# Patient Record
Sex: FEMALE | Race: WHITE | NOT HISPANIC OR LATINO | Employment: UNEMPLOYED
[De-identification: names, ages, dates, MRNs, and addresses within clinical notes are randomized per-mention and may not be internally consistent; named-entity substitution may affect disease eponyms.]

---

## 2018-10-23 ENCOUNTER — HOSPITAL (OUTPATIENT)
Dept: OTHER | Age: 48
End: 2018-10-23
Attending: OBSTETRICS & GYNECOLOGY

## 2023-11-03 ENCOUNTER — HOSPITAL ENCOUNTER (OUTPATIENT)
Dept: MAMMOGRAPHY | Age: 53
Discharge: HOME | End: 2023-11-03
Attending: OBSTETRICS & GYNECOLOGY

## 2023-11-03 DIAGNOSIS — Z12.31 ENCOUNTER FOR SCREENING MAMMOGRAM FOR MALIGNANT NEOPLASM OF BREAST: ICD-10-CM

## 2023-11-03 PROCEDURE — 77063 BREAST TOMOSYNTHESIS BI: CPT

## 2025-03-31 ENCOUNTER — HOSPITAL ENCOUNTER (OUTPATIENT)
Age: 55
Discharge: HOME | End: 2025-03-31
Attending: EMERGENCY MEDICINE
Payer: COMMERCIAL

## 2025-03-31 VITALS
TEMPERATURE: 98 F | RESPIRATION RATE: 16 BRPM | OXYGEN SATURATION: 100 % | HEART RATE: 71 BPM | SYSTOLIC BLOOD PRESSURE: 147 MMHG | DIASTOLIC BLOOD PRESSURE: 92 MMHG

## 2025-03-31 DIAGNOSIS — N39.0 URINARY TRACT INFECTION WITHOUT HEMATURIA, SITE UNSPECIFIED: Primary | ICD-10-CM

## 2025-03-31 LAB
BILIRUB UR QL STRIP: NEGATIVE
CLARITY UR: CLEAR
COLOR UR: YELLOW
GLUCOSE UR STRIP-MCNC: NEGATIVE MG/DL
KETONES UR STRIP-MCNC: NEGATIVE MG/DL
NITRITE UR QL STRIP: NEGATIVE
PH UR STRIP: 5.5 [PH]
PROT UR STRIP-MCNC: NEGATIVE MG/DL
SP GR UR STRIP: <=1.005
UROBILINOGEN UR STRIP-ACNC: <2 MG/DL

## 2025-03-31 PROCEDURE — 81002 URINALYSIS NONAUTO W/O SCOPE: CPT

## 2025-03-31 PROCEDURE — 99203 OFFICE O/P NEW LOW 30 MIN: CPT

## 2025-03-31 RX ORDER — CEFADROXIL 500 MG/1
500 CAPSULE ORAL 2 TIMES DAILY
Qty: 20 CAPSULE | Refills: 0 | Status: SHIPPED | OUTPATIENT
Start: 2025-03-31 | End: 2025-04-10

## 2025-03-31 RX ORDER — PHENAZOPYRIDINE HYDROCHLORIDE 200 MG/1
200 TABLET, FILM COATED ORAL 3 TIMES DAILY PRN
Qty: 6 TABLET | Refills: 0 | Status: SHIPPED | OUTPATIENT
Start: 2025-03-31 | End: 2025-04-02

## 2025-03-31 NOTE — ED INITIAL ASSESSMENT (HPI)
Patient reports gross hematuria Friday, Saturday and Sunday.  Sunday noted right flank pain x 1 hour.  Also reports headache and urinary urgency.

## 2025-03-31 NOTE — ED PROVIDER NOTES
Patient Seen in: [facility] [place]      History     Chief Complaint   Patient presents with    Urinary Symptoms     Stated Complaint: urinary issues, flank pain, headache    Subjective:   HPI      55 yo female with urinary symptoms. Started with blood in urine and urgency. Has had some right flank pain and headache. No fever. No vomiting. No abdominal pain.     Objective:     History reviewed. No pertinent past medical history.           History reviewed. No pertinent surgical history.             Social History     Socioeconomic History    Marital status:      Social Drivers of Health     Food Insecurity: No Food Insecurity (2/26/2025)    Received from [facility]    Hunger Vital Sign     Worried About Running Out of Food in the Last Year: Never true     Ran Out of Food in the Last Year: Never true   Transportation Needs: No Transportation Needs (2/26/2025)    Received from [facility]    PRAPARE - Transportation     Lack of Transportation (Medical): No     Lack of Transportation (Non-Medical): No   Housing Stability: Low Risk  (4/22/2024)    Received from [facility]    Housing Stability Vital Sign     Unable to Pay for Housing in the Last Year: No     Number of Places Lived in the Last Year: 1     Unstable Housing in the Last Year: No              Review of Systems    Positive for stated complaint: urinary issues, flank pain, headache  Other systems are as noted in HPI.  Constitutional and vital signs reviewed.      All other systems reviewed and negative except as noted above.    Physical Exam     ED Triage Vitals [03/31/25 1313]   BP (!) 147/92   Pulse 71   Resp 16   Temp 98.1 °F (36.7 °C)   Temp src Oral   SpO2 100 %   O2 Device None (Room air)       Current Vitals:   No data recorded    Physical Exam  Vitals and nursing note reviewed.   Constitutional:       Appearance: Normal appearance. She is well-developed.   HENT:      Head:  Normocephalic and atraumatic.   Cardiovascular:      Rate and Rhythm: Normal rate and regular rhythm.   Pulmonary:      Effort: Pulmonary effort is normal. No respiratory distress.   Abdominal:      General: There is no distension.      Palpations: Abdomen is soft.      Tenderness: There is no abdominal tenderness. There is no right CVA tenderness or left CVA tenderness.   Skin:     General: Skin is warm and dry.      Capillary Refill: Capillary refill takes less than 2 seconds.   Neurological:      General: No focal deficit present.      Mental Status: She is alert.   Psychiatric:         Mood and Affect: Mood normal.         Behavior: Behavior normal.            ED Course     Labs Reviewed   [facility] POCT URINALYSIS DIPSTICK - Abnormal; Notable for the following components:       Result Value    Blood, Urine Small (*)     Leukocyte esterase urine Trace (*)     All other components within normal limits                   MDM           Medical Decision Making  Bcterial cystitis, overactive bladder syndrome, interstitial cystitis, ascending infection all in differential.   Patient is well appearing.   UA reviewed and is consistent with infection. Will discharge on antibiotics and pyridium as prescribed. To ER for any new or worsening symptoms.     Disposition and Plan     Clinical Impression:  1. Urinary tract infection without hematuria, site unspecified         Disposition:  Discharge  3/31/2025  1:18 pm    Follow-up:  Kristen Padilla MD  [address] [place] [address]  [phone]      As needed          Medications Prescribed:  Discharge Medication List as of 3/31/2025  1:20 PM        START taking these medications    Details   cefadroxil 500 MG Oral Cap Take 1 capsule (500 mg total) by mouth 2 (two) times daily for 10 days., Normal, Disp-20 capsule, R-0      phenazopyridine 200 MG Oral Tab Take 1 tablet (200 mg total) by mouth 3 (three) times daily as needed for Pain., Normal, Disp-6 tablet, R-0                  Supplementary Documentation:

## 2025-04-04 ENCOUNTER — APPOINTMENT (OUTPATIENT)
Dept: CT IMAGING | Age: 55
End: 2025-04-04
Attending: NURSE PRACTITIONER
Payer: COMMERCIAL

## 2025-04-04 ENCOUNTER — HOSPITAL ENCOUNTER (OUTPATIENT)
Age: 55
Discharge: HOME | End: 2025-04-04
Payer: COMMERCIAL

## 2025-04-04 VITALS
HEART RATE: 68 BPM | RESPIRATION RATE: 17 BRPM | DIASTOLIC BLOOD PRESSURE: 75 MMHG | OXYGEN SATURATION: 100 % | TEMPERATURE: 98 F | SYSTOLIC BLOOD PRESSURE: 167 MMHG

## 2025-04-04 DIAGNOSIS — N20.0 KIDNEY STONE: Primary | ICD-10-CM

## 2025-04-04 LAB
BILIRUB UR QL STRIP: NEGATIVE
CLARITY UR: CLEAR
COLOR UR: YELLOW
GLUCOSE UR STRIP-MCNC: NEGATIVE MG/DL
KETONES UR STRIP-MCNC: NEGATIVE MG/DL
LEUKOCYTE ESTERASE UR QL STRIP: NEGATIVE
NITRITE UR QL STRIP: NEGATIVE
PH UR STRIP: 6 [PH]
PROT UR STRIP-MCNC: NEGATIVE MG/DL
SP GR UR STRIP: 1.01
UROBILINOGEN UR STRIP-ACNC: <2 MG/DL

## 2025-04-04 PROCEDURE — 99214 OFFICE O/P EST MOD 30 MIN: CPT

## 2025-04-04 PROCEDURE — 87086 URINE CULTURE/COLONY COUNT: CPT | Performed by: NURSE PRACTITIONER

## 2025-04-04 PROCEDURE — 74176 CT ABD & PELVIS W/O CONTRAST: CPT | Performed by: NURSE PRACTITIONER

## 2025-04-04 PROCEDURE — 81002 URINALYSIS NONAUTO W/O SCOPE: CPT

## 2025-04-04 NOTE — ED INITIAL ASSESSMENT (HPI)
Was seen here for uti on 3/31, pt on duricef, states still with urinary symptoms  with r flank pain, no fever, no n/v/d

## 2025-04-04 NOTE — ED PROVIDER NOTES
Patient Seen in: [facility] [place]      History     Chief Complaint   Patient presents with    Urinary Symptoms     Entered by patient     Stated Complaint: Urinary Symptoms    Subjective:   HPI      54-year-old female presents with complaints of urinary frequency and right low back pain.  Patient was seen in immediate care 3 days ago and was diagnosed with UTI.  She was placed on Duricef.  Patient states she has been taking the antibiotic but still is having back pain and is actually feeling worse.    Objective:     History reviewed. No pertinent past medical history.           History reviewed. No pertinent surgical history.             Social History     Socioeconomic History    Marital status:    Tobacco Use    Smoking status: Unknown   Vaping Use    Vaping status: Never Used   Substance and Sexual Activity    Alcohol use: Not Currently    Drug use: Not Currently     Social Drivers of Health     Food Insecurity: No Food Insecurity (2/26/2025)    Received from [facility]    Hunger Vital Sign     Worried About Running Out of Food in the Last Year: Never true     Ran Out of Food in the Last Year: Never true   Transportation Needs: No Transportation Needs (2/26/2025)    Received from [facility]    PRAPARE - Transportation     Lack of Transportation (Medical): No     Lack of Transportation (Non-Medical): No   Housing Stability: Low Risk  (4/22/2024)    Received from [facility]    Housing Stability Vital Sign     Unable to Pay for Housing in the Last Year: No     Number of Places Lived in the Last Year: 1     Unstable Housing in the Last Year: No              Review of Systems    Positive for stated complaint: Urinary Symptoms  Other systems are as noted in HPI.  Constitutional and vital signs reviewed.      All other systems reviewed and negative except as noted above.    Physical Exam     ED Triage Vitals [04/04/25 0927]   BP (!) 177/91    Pulse 68   Resp 17   Temp 98 °F (36.7 °C)   Temp src Oral   SpO2 100 %   O2 Device None (Room air)       Current Vitals:   Vital Signs  BP: (!) 167/75  Pulse: 68  Resp: 17  Temp: 98 °F (36.7 °C)  Temp src: Oral    Oxygen Therapy  SpO2: 100 %  O2 Device: None (Room air)        Physical Exam  Vitals reviewed.   Constitutional:       General: She is not in acute distress.  HENT:      Nose: Nose normal.   Cardiovascular:      Rate and Rhythm: Normal rate and regular rhythm.   Pulmonary:      Effort: Pulmonary effort is normal.      Breath sounds: Normal breath sounds.   Abdominal:      Palpations: Abdomen is soft.      Tenderness: There is no abdominal tenderness. There is no right CVA tenderness or left CVA tenderness.   Skin:     General: Skin is warm and dry.   Neurological:      General: No focal deficit present.      Mental Status: She is alert and oriented to person, place, and time.   Psychiatric:         Mood and Affect: Mood normal.         Behavior: Behavior normal.             ED Course     Labs Reviewed   [facility] POCT URINALYSIS DIPSTICK - Abnormal; Notable for the following components:       Result Value    Blood, Urine Small (*)     All other components within normal limits   URINE CULTURE, ROUTINE     POCT Urinalysis Dipstick        Component Value Flag Ref Range Units Status    Urine Color Yellow      Yellow  Final    Urine Clarity Clear      Clear  Final    Specific Gravity, Urine 1.010      1.005 - 1.030  Final    PH, Urine 6.0      5.0 - 8.0  Final    Protein urine Negative      Negative mg/dL Final    Glucose, Urine Negative      Negative mg/dL Final    Ketone, Urine Negative      Negative mg/dL Final    Bilirubin, Urine Negative      Negative  Final    Blood, Urine Small      Negative  Final    Nitrite Urine Negative      Negative  Final    Urobilinogen urine <2.0      <2.0 mg/dL Final    Leukocyte esterase urine Negative      Negative  Final                  CT ABDOMEN+PELVIS KIDNEYSTONE 2D RNDR(NO  IV,NO ORAL)(CPT=74176)          PROCEDURE:   CT ABDOMEN + PELVIS KIDNEYSTONE 2D RNDR (NO IV NO ORAL) (CPT=74176)     COMPARISON: None.     INDICATIONS: Urinary Symptoms     TECHNIQUE:   CT images of the abdomen and pelvis were obtained without intravenous contrast material.  Automated exposure control for dose reduction was used. Adjustment of the mA and/or kV was done based on the patient's size. Use of iterative   reconstruction technique for dose reduction was used. Dose information is transmitted to the ACR (American College of Radiology) NRDR (National Radiology Data Registry) which includes the Dose Index Registry.        FINDINGS:   LOWER CHEST: Small area of linear atelectasis/scarring in the left lower lobe (3:19).  Minimal additional scattered areas of atelectasis/scarring.  The heart is within normal limits of size.  Trace anterior pericardial effusion.     HEPATOBILIARY:   There is a 4.0 x 3.8 cm left hepatic lobe cyst (2:22).  There is a 0.8 cm hypodense posterior right hepatic lobe lesion (2:29), which may reflect a small cyst or hemangioma.  The liver is normal in size and density.  No acute   cholecystitis.  No biliary ductal dilatation.     SPLEEN:   No enlargement or focal lesion.       PANCREAS:   No lesion, fluid collection, ductal dilatation, or atrophy.       ADRENALS:   No mass or enlargement.       GENITOURINARY:   There is a 4 mm obstructing calculus at the right ureterovesicular junction (2:120, 5:40).  There is resultant upstream mild-to-moderate right hydroureteronephrosis.  There is a nonobstructing 2 mm left upper pole renal calculus (2:32).    No left hydronephrosis.  The left ureter is normal in course and caliber.  The bladder is unremarkable.     GI TRACT:    Small hiatal hernia.  No bowel obstruction.  No abnormal bowel wall thickening.  The appendix is unremarkable.  No acute appendicitis.  There is colonic diverticulosis without evidence of acute diverticulitis.     PELVIC  ORGANS: The uterus is anteverted.  There are phleboliths in the pelvis.     AORTA/VASCULAR:   No abdominal aortic aneurysm.  Minimal atherosclerotic calcification of the abdominal aorta and its branching vessels.  There is an 8 mm partially calcified splenic artery aneurysm in the hilum (2:29).     RETROPERITONEUM:   No mass or enlarged adenopathy.       PERITONEUM: No pneumoperitoneum or ascites.     LYMPH NODES: No evidence of lymphadenopathy.       BONES:  No acute fracture. No aggressive osseous lesion.  There is levoscoliosis of the lumbar spine.  Multilevel degenerative changes of the lower thoracic and lumbar spine.  Minimal sacroiliac and mild symphysis pubis degenerative changes.     OTHER:   Trace fat containing umbilical hernia.                 =====  CONCLUSION:      1. Obstructing 4 mm calculus at the right ureterovesicular junction with resultant mild to moderate upstream right hydroureteronephrosis.  2. Nonobstructing 2 mm left upper pole renal calculus.  No left hydronephrosis.  3. No bowel obstruction, acute appendicitis, abnormal bowel wall thickening, or acute diverticulitis.  4. An 8 mm peripherally calcified splenic artery aneurysm.  5. Lesser incidental findings described above.             Dictated by (CST): Dominguez Dunham MD on 4/04/2025 at 10:15 AM       Finalized by (CST): Dominguez Dunham MD on 4/04/2025 at 10:21 AM                               [facility]              Medical Decision Making  54-year-old female presents with urinary frequency and right low back pain.  Differential diagnosis includes UTI, pyelonephritis, kidney stone.  Patient was seen in immediate care on Monday for the same complaints.  She was diagnosed with a UTI and placed on Duricef.  Patient returns today and states she is still having right-sided back pain and is feeling worse.  Urine specimen was collected and shows positive blood.  Will send for culture.  Abdominal CT was done and shows an obstructing 4 mm kidney stone.   These results were discussed with patient.  She was given a strainer and a specimen container.  She was encouraged to increase her fluids.  Patient states she has been taking Advil which has helped.  Patient was given printed instructions for care of kidney stone to help with her symptoms.  She declined Zofran.  She also was given the name and phone number of urology to schedule appointment for follow-up.    Amount and/or Complexity of Data Reviewed  Labs: ordered.     Details: POC urine shows + blood    Risk  OTC drugs.        Disposition and Plan     Clinical Impression:  1. Kidney stone         Disposition:  Discharge  4/4/2025 10:32 am    Follow-up:  Rowan Giang MD  [address] [place] [id number]  [phone]    Schedule an appointment as soon as possible for a visit in 1 day            Medications Prescribed:  Discharge Medication List as of 4/4/2025 10:32 AM              Supplementary Documentation: